# Patient Record
Sex: MALE | Race: WHITE | URBAN - NONMETROPOLITAN AREA
[De-identification: names, ages, dates, MRNs, and addresses within clinical notes are randomized per-mention and may not be internally consistent; named-entity substitution may affect disease eponyms.]

---

## 2017-03-09 ENCOUNTER — DOCTOR'S OFFICE (OUTPATIENT)
Dept: URBAN - NONMETROPOLITAN AREA CLINIC 1 | Facility: CLINIC | Age: 37
Setting detail: OPHTHALMOLOGY
End: 2017-03-09
Payer: COMMERCIAL

## 2017-03-09 DIAGNOSIS — B30.9: ICD-10-CM

## 2017-03-09 PROCEDURE — 92002 INTRM OPH EXAM NEW PATIENT: CPT | Performed by: OPHTHALMOLOGY

## 2017-03-09 ASSESSMENT — REFRACTION_MANIFEST
OS_VA2: 20/
OD_VA2: 20/
OU_VA: 20/
OD_VA1: 20/
OS_VA1: 20/
OS_VA3: 20/
OU_VA: 20/
OS_VA3: 20/
OS_VA3: 20/
OU_VA: 20/
OD_VA1: 20/
OD_VA3: 20/
OD_VA1: 20/
OD_VA3: 20/
OS_VA1: 20/
OD_VA3: 20/
OD_VA2: 20/
OS_VA2: 20/
OD_VA2: 20/
OS_VA2: 20/
OS_VA1: 20/

## 2017-03-09 ASSESSMENT — REFRACTION_CURRENTRX
OS_OVR_VA: 20/
OD_OVR_VA: 20/
OS_OVR_VA: 20/
OS_OVR_VA: 20/
OD_OVR_VA: 20/
OD_OVR_VA: 20/

## 2017-03-09 ASSESSMENT — VISUAL ACUITY
OD_BCVA: 20/20
OS_BCVA: 20/20

## 2018-10-18 ENCOUNTER — RX ONLY (RX ONLY)
Age: 38
End: 2018-10-18

## 2018-10-18 ENCOUNTER — DOCTOR'S OFFICE (OUTPATIENT)
Dept: URBAN - NONMETROPOLITAN AREA CLINIC 1 | Facility: CLINIC | Age: 38
Setting detail: OPHTHALMOLOGY
End: 2018-10-18
Payer: COMMERCIAL

## 2018-10-18 DIAGNOSIS — H52.223: ICD-10-CM

## 2018-10-18 DIAGNOSIS — H52.03: ICD-10-CM

## 2018-10-18 PROBLEM — B30.9: Status: RESOLVED | Noted: 2017-03-09 | Resolved: 2018-10-18

## 2018-10-18 PROCEDURE — 92014 COMPRE OPH EXAM EST PT 1/>: CPT | Performed by: OPTOMETRIST

## 2018-10-18 ASSESSMENT — CONFRONTATIONAL VISUAL FIELD TEST (CVF)
OS_FINDINGS: FULL
OD_FINDINGS: FULL

## 2018-10-18 ASSESSMENT — REFRACTION_AUTOREFRACTION
OD_AXIS: 071
OS_AXIS: 048
OD_CYLINDER: -0.50
OS_SPHERE: +1.00
OS_CYLINDER: -0.50
OD_SPHERE: +1.25

## 2018-10-18 ASSESSMENT — REFRACTION_MANIFEST
OS_CYLINDER: -0.50
OS_VA3: 20/
OS_VA1: 20/20
OU_VA: 20/
OD_VA1: 20/
OU_VA: 20/20
OD_VA3: 20/
OS_VA1: 20/
OS_VA2: 20/
OS_VA3: 20/
OD_VA3: 20/
OD_VA2: 20/
OS_SPHERE: +0.75
OD_VA2: 20/
OD_CYLINDER: -0.50
OS_AXIS: 075
OD_VA1: 20/20
OD_SPHERE: +0.75
OD_AXIS: 090
OS_VA2: 20/

## 2018-10-18 ASSESSMENT — REFRACTION_CURRENTRX
OS_OVR_VA: 20/
OD_OVR_VA: 20/
OD_OVR_VA: 20/
OS_OVR_VA: 20/
OD_OVR_VA: 20/
OS_OVR_VA: 20/

## 2018-10-18 ASSESSMENT — VISUAL ACUITY
OS_BCVA: 20/20-2
OD_BCVA: 20/20

## 2018-10-18 ASSESSMENT — SPHEQUIV_DERIVED
OD_SPHEQUIV: 0.5
OS_SPHEQUIV: 0.5
OD_SPHEQUIV: 1
OS_SPHEQUIV: 0.75

## 2018-11-09 ENCOUNTER — OPTICAL OFFICE (OUTPATIENT)
Dept: URBAN - NONMETROPOLITAN AREA CLINIC 4 | Facility: CLINIC | Age: 38
Setting detail: OPHTHALMOLOGY
End: 2018-11-09
Payer: COMMERCIAL

## 2018-11-09 DIAGNOSIS — H52.223: ICD-10-CM

## 2018-11-09 PROCEDURE — V2103 SPHEROCYLINDR 4.00D/12-2.00D: HCPCS | Performed by: OPTOMETRIST

## 2018-11-09 PROCEDURE — V2750 ANTI-REFLECTIVE COATING: HCPCS | Performed by: OPTOMETRIST

## 2018-11-09 PROCEDURE — V2025 EYEGLASSES DELUX FRAMES: HCPCS | Performed by: OPTOMETRIST

## 2018-11-09 PROCEDURE — V2020 VISION SVCS FRAMES PURCHASES: HCPCS | Performed by: OPTOMETRIST

## 2019-09-03 ENCOUNTER — OPTICAL OFFICE (OUTPATIENT)
Dept: URBAN - NONMETROPOLITAN AREA CLINIC 4 | Facility: CLINIC | Age: 39
Setting detail: OPHTHALMOLOGY
End: 2019-09-03

## 2019-09-03 ENCOUNTER — DOCTOR'S OFFICE (OUTPATIENT)
Dept: URBAN - NONMETROPOLITAN AREA CLINIC 1 | Facility: CLINIC | Age: 39
Setting detail: OPHTHALMOLOGY
End: 2019-09-03

## 2019-09-03 DIAGNOSIS — H52.03: ICD-10-CM

## 2019-09-03 DIAGNOSIS — H01.001: ICD-10-CM

## 2019-09-03 DIAGNOSIS — H52.223: ICD-10-CM

## 2019-09-03 DIAGNOSIS — H01.004: ICD-10-CM

## 2019-09-03 PROCEDURE — V2750 ANTI-REFLECTIVE COATING: HCPCS | Performed by: OPTOMETRIST

## 2019-09-03 PROCEDURE — SELFPAYVIS VISION VISIT SELF PAY: Performed by: OPTOMETRIST

## 2019-09-03 PROCEDURE — V2103 SPHEROCYLINDR 4.00D/12-2.00D: HCPCS | Performed by: OPTOMETRIST

## 2019-09-03 PROCEDURE — V2799 MISC VISION ITEM OR SERVICE: HCPCS | Performed by: OPTOMETRIST

## 2019-09-03 PROCEDURE — V2020 VISION SVCS FRAMES PURCHASES: HCPCS | Performed by: OPTOMETRIST

## 2019-09-03 ASSESSMENT — REFRACTION_MANIFEST
OS_VA1: 20/20
OS_VA3: 20/
OS_AXIS: 090
OD_AXIS: 100
OS_CYLINDER: -0.50
OS_VA1: 20/20
OD_VA3: 20/
OD_CYLINDER: -0.50
OD_VA2: 20/
OD_VA1: 20/20
OD_VA3: 20/
OS_SPHERE: +1.00
OD_SPHERE: +1.00
OS_VA2: 20/
OS_SPHERE: +1.00
OS_VA2: 20/20
OD_SPHERE: +1.00
OD_VA1: 20/20
OS_ADD: +1.00
OD_CYLINDER: -0.50
OD_VA2: 20/20
OU_VA: 20/20
OS_VA3: 20/
OU_VA: 20/
OD_AXIS: 100
OS_AXIS: 090
OD_ADD: +1.00
OS_CYLINDER: -0.50

## 2019-09-03 ASSESSMENT — SPHEQUIV_DERIVED
OS_SPHEQUIV: 1.125
OD_SPHEQUIV: 1.125
OS_SPHEQUIV: 0.75
OS_SPHEQUIV: 0.75
OD_SPHEQUIV: 0.75
OD_SPHEQUIV: 0.75

## 2019-09-03 ASSESSMENT — REFRACTION_CURRENTRX
OD_CYLINDER: -0.50
OD_AXIS: 090
OS_OVR_VA: 20/
OS_CYLINDER: -0.50
OS_OVR_VA: 20/
OD_OVR_VA: 20/
OD_VPRISM_DIRECTION: SV
OS_AXIS: 072
OD_OVR_VA: 20/
OD_OVR_VA: 20/
OD_SPHERE: +0.75
OS_VPRISM_DIRECTION: SV
OS_SPHERE: +0.75
OS_OVR_VA: 20/

## 2019-09-03 ASSESSMENT — REFRACTION_AUTOREFRACTION
OS_SPHERE: +1.25
OD_AXIS: 100
OD_SPHERE: +1.50
OS_AXIS: 095
OS_CYLINDER: -0.25
OD_CYLINDER: -0.75

## 2019-09-03 ASSESSMENT — LID EXAM ASSESSMENTS
OD_BLEPHARITIS: RUL T 1+
OS_BLEPHARITIS: LUL T 1+

## 2019-09-03 ASSESSMENT — VISUAL ACUITY
OS_BCVA: 20/20-1
OD_BCVA: 20/20

## 2019-09-03 ASSESSMENT — CONFRONTATIONAL VISUAL FIELD TEST (CVF)
OD_FINDINGS: FULL
OS_FINDINGS: FULL

## 2022-08-16 ENCOUNTER — OPTICAL OFFICE (OUTPATIENT)
Dept: URBAN - NONMETROPOLITAN AREA CLINIC 4 | Facility: CLINIC | Age: 42
Setting detail: OPHTHALMOLOGY
End: 2022-08-16
Payer: COMMERCIAL

## 2022-08-16 DIAGNOSIS — H52.223: ICD-10-CM

## 2022-08-16 PROCEDURE — V2799 MISC VISION ITEM OR SERVICE: HCPCS | Performed by: OPHTHALMOLOGY

## 2022-08-16 PROCEDURE — V2750 ANTI-REFLECTIVE COATING: HCPCS | Performed by: OPHTHALMOLOGY

## 2022-08-16 PROCEDURE — V2103 SPHEROCYLINDR 4.00D/12-2.00D: HCPCS | Performed by: OPHTHALMOLOGY

## 2022-08-16 PROCEDURE — V2020 VISION SVCS FRAMES PURCHASES: HCPCS | Performed by: OPHTHALMOLOGY

## 2022-09-12 ENCOUNTER — OFFICE VISIT (OUTPATIENT)
Dept: URGENT CARE | Facility: CLINIC | Age: 42
End: 2022-09-12
Payer: COMMERCIAL

## 2022-09-12 VITALS
BODY MASS INDEX: 29.86 KG/M2 | DIASTOLIC BLOOD PRESSURE: 93 MMHG | RESPIRATION RATE: 20 BRPM | HEART RATE: 92 BPM | SYSTOLIC BLOOD PRESSURE: 137 MMHG | HEIGHT: 68 IN | OXYGEN SATURATION: 96 % | TEMPERATURE: 98.4 F | WEIGHT: 197 LBS

## 2022-09-12 DIAGNOSIS — H10.32 ACUTE CONJUNCTIVITIS OF LEFT EYE, UNSPECIFIED ACUTE CONJUNCTIVITIS TYPE: Primary | ICD-10-CM

## 2022-09-12 PROCEDURE — 99203 OFFICE O/P NEW LOW 30 MIN: CPT

## 2022-09-12 RX ORDER — POLYMYXIN B SULFATE AND TRIMETHOPRIM 1; 10000 MG/ML; [USP'U]/ML
1 SOLUTION OPHTHALMIC EVERY 6 HOURS
Qty: 10 ML | Refills: 0 | Status: SHIPPED | OUTPATIENT
Start: 2022-09-12 | End: 2022-09-19

## 2022-09-12 NOTE — PROGRESS NOTES
[facility]        NAME: Cara Thomas is a 36 y o  male  : 1980    MRN: [id number]  DATE: 2022  TIME: 2:30 PM    Assessment and Plan   Acute conjunctivitis of left eye, unspecified acute conjunctivitis type [H10 32]  1  Acute conjunctivitis of left eye, unspecified acute conjunctivitis type  polymyxin b-trimethoprim (POLYTRIM) ophthalmic solution     Will treat conjunctivitis with poly trim,  Pt is a non contact wearer  Patient Instructions   Use eye drops as directed  May place eye drops in the fridge  Follow up with PCP in 3-5 days  Proceed to  ER if symptoms worsen  Chief Complaint     Chief Complaint   Patient presents with    Conjunctivitis         History of Present Illness       Patient is a 36year old male who presents to the office today for left eye pain  States that it started as light sensitivity and then the eye was red  Denies changes to vision  Light sensitivity  Slight headache, no fever  Does not wear contacts  Denies getting anything in to the eye  Review of Systems   Review of Systems   Constitutional: Negative for chills, fatigue and fever  Eyes: Positive for photophobia, pain and redness  Negative for discharge, itching and visual disturbance  Neurological: Positive for headaches  All other systems reviewed and are negative  Current Medications       Current Outpatient Medications:     polymyxin b-trimethoprim (POLYTRIM) ophthalmic solution, Administer 1 drop into the left eye every 6 (six) hours for 7 days, Disp: 10 mL, Rfl: 0    Current Allergies     Allergies as of 2022    (No Known Allergies)            The following portions of the patient's history were reviewed and updated as appropriate: allergies, current medications, past family history, past medical history, past social history, past surgical history and problem list      Past Medical History:   Diagnosis Date    Colitis        History reviewed   No pertinent surgical history  History reviewed  No pertinent family history  Medications have been verified  Objective   /93   Pulse 92   Temp 98 4 °F (36 9 °C)   Resp 20   Ht 5' 8" (1 727 m)   Wt 89 4 kg (197 lb)   SpO2 96%   BMI 29 95 kg/m²        Physical Exam     Physical Exam  Vitals and nursing note reviewed  Constitutional:       General: He is not in acute distress  Appearance: Normal appearance  He is normal weight  He is not ill-appearing or toxic-appearing  Eyes:      General: Lids are normal  Lids are everted, no foreign bodies appreciated  Vision grossly intact  Gaze aligned appropriately  No allergic shiner, visual field deficit or scleral icterus  Right eye: No discharge  Left eye: No foreign body, discharge or hordeolum  Extraocular Movements: Extraocular movements intact  Left eye: Normal extraocular motion and no nystagmus  Conjunctiva/sclera:      Left eye: Left conjunctiva is injected  No chemosis, exudate or hemorrhage  Pupils: Pupils are equal, round, and reactive to light  Left eye: Pupil is round, reactive and not sluggish  Funduscopic exam:        Left eye: No hemorrhage, exudate, AV nicking, arteriolar narrowing or papilledema  Red reflex and venous pulsations present  Visual Fields: Right eye visual fields normal and left eye visual fields normal    Neurological:      Mental Status: He is alert                Visual Acuity Screening    Right eye Left eye Both eyes   Without correction:      With correction: 20/15 20/20 20/13

## 2022-09-14 ENCOUNTER — DOCTOR'S OFFICE (OUTPATIENT)
Dept: URBAN - NONMETROPOLITAN AREA CLINIC 1 | Facility: CLINIC | Age: 42
Setting detail: OPHTHALMOLOGY
End: 2022-09-14
Payer: COMMERCIAL

## 2022-09-14 DIAGNOSIS — H20.00: ICD-10-CM

## 2022-09-14 DIAGNOSIS — H25.13: ICD-10-CM

## 2022-09-14 PROCEDURE — 99214 OFFICE O/P EST MOD 30 MIN: CPT | Performed by: OPHTHALMOLOGY

## 2022-09-14 ASSESSMENT — REFRACTION_CURRENTRX
OD_OVR_VA: 20/
OS_CYLINDER: -0.50
OD_VPRISM_DIRECTION: SV
OS_VPRISM_DIRECTION: SV
OS_AXIS: 072
OD_SPHERE: +0.75
OD_AXIS: 090
OS_OVR_VA: 20/
OD_CYLINDER: -0.50
OS_SPHERE: +0.75

## 2022-09-14 ASSESSMENT — REFRACTION_MANIFEST
OD_ADD: +1.00
OD_CYLINDER: -0.50
OD_VA1: 20/20
OS_CYLINDER: -0.50
OS_VA1: 20/20
OD_SPHERE: +1.00
OD_AXIS: 100
OS_VA1: 20/20
OS_SPHERE: +1.00
OU_VA: 20/20
OS_SPHERE: +1.00
OS_CYLINDER: -0.50
OD_VA1: 20/20
OS_ADD: +1.00
OS_VA2: 20/20
OD_VA2: 20/20
OD_CYLINDER: -0.50
OS_AXIS: 090
OS_AXIS: 090
OD_AXIS: 100
OD_SPHERE: +1.00

## 2022-09-14 ASSESSMENT — REFRACTION_AUTOREFRACTION
OS_AXIS: 092
OD_SPHERE: +0.75
OS_CYLINDER: -0.50
OS_SPHERE: +0.75
OD_CYLINDER: -0.50
OD_AXIS: 096

## 2022-09-14 ASSESSMENT — AXIALLENGTH_DERIVED
OD_AL: 22.6502
OD_AL: 22.6502
OS_AL: 22.613
OS_AL: 22.5239
OS_AL: 22.5239
OD_AL: 22.7403

## 2022-09-14 ASSESSMENT — SPHEQUIV_DERIVED
OS_SPHEQUIV: 0.75
OS_SPHEQUIV: 0.5
OS_SPHEQUIV: 0.75
OD_SPHEQUIV: 0.75
OD_SPHEQUIV: 0.5
OD_SPHEQUIV: 0.75

## 2022-09-14 ASSESSMENT — LID EXAM ASSESSMENTS
OS_BLEPHARITIS: LUL T 1+
OD_BLEPHARITIS: RUL T 1+

## 2022-09-14 ASSESSMENT — KERATOMETRY
OD_K2POWER_DIOPTERS: 45.50
OD_K1POWER_DIOPTERS: 45.25
OS_K2POWER_DIOPTERS: 46.00
OD_AXISANGLE_DEGREES: 043
OS_AXISANGLE_DEGREES: 032
OS_K1POWER_DIOPTERS: 45.50

## 2022-09-14 ASSESSMENT — VISUAL ACUITY
OD_BCVA: 20/20
OS_BCVA: 20/20

## 2022-09-14 ASSESSMENT — CONFRONTATIONAL VISUAL FIELD TEST (CVF)
OD_FINDINGS: FULL
OS_FINDINGS: FULL

## 2022-09-19 ENCOUNTER — RX ONLY (RX ONLY)
Age: 42
End: 2022-09-19

## 2022-09-19 ENCOUNTER — DOCTOR'S OFFICE (OUTPATIENT)
Dept: URBAN - NONMETROPOLITAN AREA CLINIC 1 | Facility: CLINIC | Age: 42
Setting detail: OPHTHALMOLOGY
End: 2022-09-19
Payer: COMMERCIAL

## 2022-09-19 DIAGNOSIS — H20.00: ICD-10-CM

## 2022-09-19 PROCEDURE — 99213 OFFICE O/P EST LOW 20 MIN: CPT | Performed by: OPHTHALMOLOGY

## 2022-09-19 ASSESSMENT — VISUAL ACUITY
OS_BCVA: 20/20-2
OD_BCVA: 20/30-1

## 2022-09-19 ASSESSMENT — REFRACTION_MANIFEST
OS_AXIS: 090
OD_AXIS: 100
OS_VA2: 20/20
OS_AXIS: 090
OD_SPHERE: +1.00
OS_CYLINDER: -0.50
OS_SPHERE: +1.00
OS_SPHERE: +1.00
OS_CYLINDER: -0.50
OD_SPHERE: +1.00
OS_ADD: +1.00
OD_CYLINDER: -0.50
OD_VA1: 20/20
OD_VA1: 20/20
OU_VA: 20/20
OS_VA1: 20/20
OS_VA1: 20/20
OD_VA2: 20/20
OD_AXIS: 100
OD_CYLINDER: -0.50
OD_ADD: +1.00

## 2022-09-19 ASSESSMENT — REFRACTION_CURRENTRX
OS_AXIS: 072
OS_OVR_VA: 20/
OS_CYLINDER: -0.50
OD_AXIS: 090
OD_SPHERE: +0.75
OD_OVR_VA: 20/
OD_VPRISM_DIRECTION: SV
OD_CYLINDER: -0.50
OS_VPRISM_DIRECTION: SV
OS_SPHERE: +0.75

## 2022-09-19 ASSESSMENT — REFRACTION_AUTOREFRACTION
OD_SPHERE: +0.75
OS_CYLINDER: -0.50
OD_CYLINDER: -0.50
OD_AXIS: 096
OS_AXIS: 092
OS_SPHERE: +0.75

## 2022-09-19 ASSESSMENT — AXIALLENGTH_DERIVED
OS_AL: 22.5239
OD_AL: 22.6502
OS_AL: 22.5239
OD_AL: 22.7403
OS_AL: 22.613
OD_AL: 22.6502

## 2022-09-19 ASSESSMENT — KERATOMETRY
OD_K2POWER_DIOPTERS: 45.50
OS_K1POWER_DIOPTERS: 45.50
OD_K1POWER_DIOPTERS: 45.25
OS_K2POWER_DIOPTERS: 46.00
OS_AXISANGLE_DEGREES: 032
OD_AXISANGLE_DEGREES: 043

## 2022-09-19 ASSESSMENT — SPHEQUIV_DERIVED
OD_SPHEQUIV: 0.75
OS_SPHEQUIV: 0.75
OD_SPHEQUIV: 0.5
OS_SPHEQUIV: 0.75
OD_SPHEQUIV: 0.75
OS_SPHEQUIV: 0.5

## 2022-09-19 ASSESSMENT — LID EXAM ASSESSMENTS
OS_BLEPHARITIS: LUL T 1+
OD_BLEPHARITIS: RUL T 1+

## 2022-09-27 ENCOUNTER — DOCTOR'S OFFICE (OUTPATIENT)
Dept: URBAN - NONMETROPOLITAN AREA CLINIC 1 | Facility: CLINIC | Age: 42
Setting detail: OPHTHALMOLOGY
End: 2022-09-27
Payer: COMMERCIAL

## 2022-09-27 DIAGNOSIS — H20.00: ICD-10-CM

## 2022-09-27 PROCEDURE — 99213 OFFICE O/P EST LOW 20 MIN: CPT | Performed by: OPHTHALMOLOGY

## 2022-09-27 ASSESSMENT — REFRACTION_MANIFEST
OD_AXIS: 100
OS_VA2: 20/20
OD_SPHERE: +1.00
OS_CYLINDER: -0.50
OU_VA: 20/20
OS_SPHERE: +1.00
OD_VA1: 20/20
OD_VA1: 20/20
OD_CYLINDER: -0.50
OS_VA1: 20/20
OS_AXIS: 090
OD_AXIS: 100
OS_VA1: 20/20
OS_ADD: +1.00
OD_ADD: +1.00
OS_AXIS: 090
OD_VA2: 20/20
OD_SPHERE: +1.00
OS_SPHERE: +1.00
OS_CYLINDER: -0.50
OD_CYLINDER: -0.50

## 2022-09-27 ASSESSMENT — KERATOMETRY
OS_K1POWER_DIOPTERS: 45.75
OD_K1POWER_DIOPTERS: 45.25
OD_AXISANGLE_DEGREES: 062
OS_K2POWER_DIOPTERS: 45.75
OS_AXISANGLE_DEGREES: 032
OD_K2POWER_DIOPTERS: 45.50

## 2022-09-27 ASSESSMENT — VISUAL ACUITY
OS_BCVA: 20/20
OD_BCVA: 20/20-2

## 2022-09-27 ASSESSMENT — SPHEQUIV_DERIVED
OD_SPHEQUIV: 1.125
OS_SPHEQUIV: 1.25
OD_SPHEQUIV: 0.75
OS_SPHEQUIV: 0.75
OD_SPHEQUIV: 0.75
OS_SPHEQUIV: 0.75

## 2022-09-27 ASSESSMENT — REFRACTION_AUTOREFRACTION
OD_SPHERE: +1.25
OD_AXIS: 101
OS_SPHERE: +1.50
OD_CYLINDER: -0.25
OS_AXIS: 080
OS_CYLINDER: -0.50

## 2022-09-27 ASSESSMENT — REFRACTION_CURRENTRX
OD_SPHERE: +0.75
OD_CYLINDER: -0.50
OS_SPHERE: +0.75
OD_AXIS: 090
OD_VPRISM_DIRECTION: SV
OD_OVR_VA: 20/
OS_AXIS: 072
OS_OVR_VA: 20/
OS_CYLINDER: -0.50
OS_VPRISM_DIRECTION: SV

## 2022-09-27 ASSESSMENT — AXIALLENGTH_DERIVED
OS_AL: 22.5239
OS_AL: 22.5239
OD_AL: 22.6502
OD_AL: 22.6502
OS_AL: 22.3477
OD_AL: 22.5163

## 2022-09-27 ASSESSMENT — LID EXAM ASSESSMENTS
OD_BLEPHARITIS: RUL T 1+
OS_BLEPHARITIS: LUL T 1+

## 2022-09-27 ASSESSMENT — CONFRONTATIONAL VISUAL FIELD TEST (CVF)
OS_FINDINGS: FULL
OD_FINDINGS: FULL

## 2022-10-03 ENCOUNTER — DOCTOR'S OFFICE (OUTPATIENT)
Dept: URBAN - NONMETROPOLITAN AREA CLINIC 1 | Facility: CLINIC | Age: 42
Setting detail: OPHTHALMOLOGY
End: 2022-10-03
Payer: COMMERCIAL

## 2022-10-03 DIAGNOSIS — H20.00: ICD-10-CM

## 2022-10-03 PROBLEM — H52.03 HYPEROPIA; BOTH EYES: Status: ACTIVE | Noted: 2018-10-18

## 2022-10-03 PROBLEM — H01.004 BLEPHARITIS; RIGHT UPPER LID, LEFT UPPER LID: Status: ACTIVE | Noted: 2019-09-03

## 2022-10-03 PROBLEM — H01.001 BLEPHARITIS; RIGHT UPPER LID, LEFT UPPER LID: Status: ACTIVE | Noted: 2019-09-03

## 2022-10-03 PROBLEM — H52.223 ASTIGMATISM, REGULAR; BOTH EYES: Status: ACTIVE | Noted: 2018-10-18

## 2022-10-03 PROCEDURE — 99213 OFFICE O/P EST LOW 20 MIN: CPT | Performed by: OPHTHALMOLOGY

## 2022-10-03 ASSESSMENT — REFRACTION_AUTOREFRACTION
OS_SPHERE: +1.00
OD_AXIS: 104
OD_CYLINDER: +1.00
OD_SPHERE: +1.75

## 2022-10-03 ASSESSMENT — AXIALLENGTH_DERIVED
OS_AL: 22.4405
OD_AL: 22.6079
OD_AL: 22.6079
OS_AL: 22.4405
OD_AL: 22.0837

## 2022-10-03 ASSESSMENT — REFRACTION_CURRENTRX
OD_SPHERE: +0.75
OS_VPRISM_DIRECTION: SV
OS_CYLINDER: -0.50
OD_AXIS: 090
OS_OVR_VA: 20/
OD_CYLINDER: -0.50
OD_VPRISM_DIRECTION: SV
OD_OVR_VA: 20/
OS_AXIS: 072
OS_SPHERE: +0.75

## 2022-10-03 ASSESSMENT — REFRACTION_MANIFEST
OS_AXIS: 090
OS_VA2: 20/20
OD_AXIS: 100
OD_CYLINDER: -0.50
OS_ADD: +1.00
OD_AXIS: 100
OD_VA1: 20/20
OD_CYLINDER: -0.50
OD_SPHERE: +1.00
OS_CYLINDER: -0.50
OS_VA1: 20/20
OD_SPHERE: +1.00
OS_CYLINDER: -0.50
OD_VA1: 20/20
OS_AXIS: 090
OD_VA2: 20/20
OS_SPHERE: +1.00
OS_VA1: 20/20
OU_VA: 20/20
OS_SPHERE: +1.00
OD_ADD: +1.00

## 2022-10-03 ASSESSMENT — SPHEQUIV_DERIVED
OS_SPHEQUIV: 0.75
OD_SPHEQUIV: 0.75
OD_SPHEQUIV: 0.75
OD_SPHEQUIV: 2.25
OS_SPHEQUIV: 0.75

## 2022-10-03 ASSESSMENT — CONFRONTATIONAL VISUAL FIELD TEST (CVF)
OD_FINDINGS: FULL
OS_FINDINGS: FULL

## 2022-10-03 ASSESSMENT — KERATOMETRY
OD_K1POWER_DIOPTERS: 45.50
OD_K2POWER_DIOPTERS: 45.50
OS_K2POWER_DIOPTERS: 46.00
OS_K1POWER_DIOPTERS: 46.00

## 2022-10-03 ASSESSMENT — LID EXAM ASSESSMENTS
OS_BLEPHARITIS: LUL T 1+
OD_BLEPHARITIS: RUL T 1+

## 2022-10-03 ASSESSMENT — VISUAL ACUITY
OD_BCVA: 20/20-2
OS_BCVA: 20/20

## 2022-10-18 ENCOUNTER — DOCTOR'S OFFICE (OUTPATIENT)
Dept: URBAN - NONMETROPOLITAN AREA CLINIC 1 | Facility: CLINIC | Age: 42
Setting detail: OPHTHALMOLOGY
End: 2022-10-18
Payer: COMMERCIAL

## 2022-10-18 DIAGNOSIS — H20.00: ICD-10-CM

## 2022-10-18 PROCEDURE — 99213 OFFICE O/P EST LOW 20 MIN: CPT | Performed by: OPHTHALMOLOGY

## 2022-10-18 ASSESSMENT — LID EXAM ASSESSMENTS
OD_BLEPHARITIS: RUL T
OS_BLEPHARITIS: LUL T

## 2022-10-18 ASSESSMENT — REFRACTION_MANIFEST
OD_CYLINDER: -0.50
OD_ADD: +1.00
OS_VA2: 20/20
OD_VA2: 20/20
OS_SPHERE: +1.00
OS_VA1: 20/20
OD_VA1: 20/20
OS_CYLINDER: -0.50
OU_VA: 20/20
OS_VA1: 20/20
OD_CYLINDER: -0.50
OS_SPHERE: +1.00
OD_SPHERE: +1.00
OD_SPHERE: +1.00
OS_CYLINDER: -0.50
OS_AXIS: 090
OD_AXIS: 100
OD_VA1: 20/20
OS_AXIS: 090
OS_ADD: +1.00
OD_AXIS: 100

## 2022-10-18 ASSESSMENT — KERATOMETRY
OD_AXISANGLE_DEGREES: 031
OS_K2POWER_DIOPTERS: 46.00
OS_AXISANGLE_DEGREES: 090
OS_K1POWER_DIOPTERS: 45.75
OD_K1POWER_DIOPTERS: 45.25
OD_K2POWER_DIOPTERS: 45.50

## 2022-10-18 ASSESSMENT — SPHEQUIV_DERIVED
OD_SPHEQUIV: 0.75
OS_SPHEQUIV: 0.75
OD_SPHEQUIV: 0.5
OD_SPHEQUIV: 0.75
OS_SPHEQUIV: 0.75

## 2022-10-18 ASSESSMENT — REFRACTION_CURRENTRX
OS_OVR_VA: 20/
OD_VPRISM_DIRECTION: SV
OD_OVR_VA: 20/
OD_CYLINDER: -0.50
OD_AXIS: 090
OS_SPHERE: +0.75
OS_CYLINDER: -0.50
OS_AXIS: 072
OD_SPHERE: +0.75
OS_VPRISM_DIRECTION: SV

## 2022-10-18 ASSESSMENT — REFRACTION_AUTOREFRACTION
OS_SPHERE: +0.25
OD_CYLINDER: -0.50
OD_AXIS: 084
OD_SPHERE: +0.75

## 2022-10-18 ASSESSMENT — AXIALLENGTH_DERIVED
OD_AL: 22.6502
OD_AL: 22.7403
OS_AL: 22.4821
OD_AL: 22.6502
OS_AL: 22.4821

## 2022-10-18 ASSESSMENT — CONFRONTATIONAL VISUAL FIELD TEST (CVF)
OD_FINDINGS: FULL
OS_FINDINGS: FULL

## 2022-10-18 ASSESSMENT — VISUAL ACUITY
OD_BCVA: 20/20
OS_BCVA: 20/20

## 2022-11-15 ENCOUNTER — DOCTOR'S OFFICE (OUTPATIENT)
Dept: URBAN - NONMETROPOLITAN AREA CLINIC 1 | Facility: CLINIC | Age: 42
Setting detail: OPHTHALMOLOGY
End: 2022-11-15
Payer: COMMERCIAL

## 2022-11-15 DIAGNOSIS — H20.00: ICD-10-CM

## 2022-11-15 PROCEDURE — 99213 OFFICE O/P EST LOW 20 MIN: CPT | Performed by: OPHTHALMOLOGY

## 2022-11-15 ASSESSMENT — LID EXAM ASSESSMENTS
OS_BLEPHARITIS: LUL T
OD_BLEPHARITIS: RUL T

## 2022-11-15 ASSESSMENT — REFRACTION_CURRENTRX
OS_SPHERE: +0.75
OS_OVR_VA: 20/
OD_OVR_VA: 20/
OS_VPRISM_DIRECTION: SV
OS_AXIS: 072
OD_VPRISM_DIRECTION: SV
OD_AXIS: 090
OD_SPHERE: +0.75
OS_CYLINDER: -0.50
OD_CYLINDER: -0.50

## 2022-11-15 ASSESSMENT — REFRACTION_MANIFEST
OD_VA1: 20/20
OD_ADD: +1.00
OS_SPHERE: +1.00
OS_VA2: 20/20
OS_SPHERE: +1.00
OS_VA1: 20/20
OU_VA: 20/20
OD_SPHERE: +1.00
OS_AXIS: 090
OS_CYLINDER: -0.50
OD_SPHERE: +1.00
OD_VA2: 20/20
OD_AXIS: 100
OS_CYLINDER: -0.50
OS_VA1: 20/20
OD_CYLINDER: -0.50
OD_VA1: 20/20
OS_AXIS: 090
OD_CYLINDER: -0.50
OD_AXIS: 100
OS_ADD: +1.00

## 2022-11-15 ASSESSMENT — VISUAL ACUITY
OS_BCVA: 20/20
OD_BCVA: 20/20

## 2022-11-15 ASSESSMENT — AXIALLENGTH_DERIVED
OS_AL: 22.0278
OS_AL: 22.113
OD_AL: 22.6926
OS_AL: 22.113
OD_AL: 22.5137
OD_AL: 22.6926

## 2022-11-15 ASSESSMENT — REFRACTION_AUTOREFRACTION
OS_SPHERE: +1.00
OD_AXIS: 116
OD_CYLINDER: -0.50
OD_SPHERE: +1.50
OS_CYLINDER: 0.00

## 2022-11-15 ASSESSMENT — CONFRONTATIONAL VISUAL FIELD TEST (CVF)
OD_FINDINGS: FULL
OS_FINDINGS: FULL

## 2022-11-15 ASSESSMENT — SPHEQUIV_DERIVED
OD_SPHEQUIV: 0.75
OD_SPHEQUIV: 1.25
OD_SPHEQUIV: 0.75
OS_SPHEQUIV: 0.75
OS_SPHEQUIV: 0.75
OS_SPHEQUIV: 1

## 2022-11-15 ASSESSMENT — KERATOMETRY
OD_K2POWER_DIOPTERS: 45.50
OD_AXISANGLE_DEGREES: 032
OS_AXISANGLE_DEGREES: 102
OS_K2POWER_DIOPTERS: 48.00
OD_K1POWER_DIOPTERS: 45.00
OS_K1POWER_DIOPTERS: 46.00

## 2022-12-14 ENCOUNTER — RX ONLY (RX ONLY)
Age: 42
End: 2022-12-14

## 2022-12-14 ENCOUNTER — DOCTOR'S OFFICE (OUTPATIENT)
Dept: URBAN - NONMETROPOLITAN AREA CLINIC 1 | Facility: CLINIC | Age: 42
Setting detail: OPHTHALMOLOGY
End: 2022-12-14
Payer: COMMERCIAL

## 2022-12-14 DIAGNOSIS — H20.00: ICD-10-CM

## 2022-12-14 PROCEDURE — 99213 OFFICE O/P EST LOW 20 MIN: CPT | Performed by: OPHTHALMOLOGY

## 2022-12-14 ASSESSMENT — REFRACTION_AUTOREFRACTION
OS_CYLINDER: 0.00
OD_CYLINDER: -0.50
OD_SPHERE: +1.50
OS_SPHERE: +1.00
OD_AXIS: 116

## 2022-12-14 ASSESSMENT — LID EXAM ASSESSMENTS
OS_BLEPHARITIS: LUL T
OD_BLEPHARITIS: RUL T

## 2022-12-14 ASSESSMENT — REFRACTION_MANIFEST
OD_VA1: 20/20
OS_SPHERE: +1.00
OD_CYLINDER: -0.50
OD_SPHERE: +1.00
OS_ADD: +1.00
OU_VA: 20/20
OS_VA1: 20/20
OD_SPHERE: +1.00
OS_CYLINDER: -0.50
OS_AXIS: 090
OD_VA2: 20/20
OS_VA2: 20/20
OS_CYLINDER: -0.50
OD_AXIS: 100
OD_AXIS: 100
OS_AXIS: 090
OD_ADD: +1.00
OD_CYLINDER: -0.50
OD_VA1: 20/20
OS_SPHERE: +1.00
OS_VA1: 20/20

## 2022-12-14 ASSESSMENT — AXIALLENGTH_DERIVED
OS_AL: 22.113
OD_AL: 22.6926
OD_AL: 22.5137
OS_AL: 22.0278
OS_AL: 22.113
OD_AL: 22.6926

## 2022-12-14 ASSESSMENT — KERATOMETRY
OS_K1POWER_DIOPTERS: 46.00
OD_K1POWER_DIOPTERS: 45.00
OD_AXISANGLE_DEGREES: 032
OS_AXISANGLE_DEGREES: 102
OD_K2POWER_DIOPTERS: 45.50
OS_K2POWER_DIOPTERS: 48.00

## 2022-12-14 ASSESSMENT — REFRACTION_CURRENTRX
OS_SPHERE: +0.75
OS_VPRISM_DIRECTION: SV
OD_AXIS: 090
OD_CYLINDER: -0.50
OD_SPHERE: +0.75
OS_OVR_VA: 20/
OS_CYLINDER: -0.50
OD_VPRISM_DIRECTION: SV
OD_OVR_VA: 20/
OS_AXIS: 072

## 2022-12-14 ASSESSMENT — SPHEQUIV_DERIVED
OS_SPHEQUIV: 1
OD_SPHEQUIV: 0.75
OD_SPHEQUIV: 0.75
OS_SPHEQUIV: 0.75
OD_SPHEQUIV: 1.25
OS_SPHEQUIV: 0.75

## 2022-12-14 ASSESSMENT — CONFRONTATIONAL VISUAL FIELD TEST (CVF)
OS_FINDINGS: FULL
OD_FINDINGS: FULL

## 2022-12-14 ASSESSMENT — VISUAL ACUITY
OD_BCVA: 20/20
OS_BCVA: 20/20

## 2023-04-12 ENCOUNTER — DOCTOR'S OFFICE (OUTPATIENT)
Dept: URBAN - NONMETROPOLITAN AREA CLINIC 1 | Facility: CLINIC | Age: 43
Setting detail: OPHTHALMOLOGY
End: 2023-04-12
Payer: COMMERCIAL

## 2023-04-12 DIAGNOSIS — H20.00: ICD-10-CM

## 2023-04-12 PROCEDURE — 99213 OFFICE O/P EST LOW 20 MIN: CPT | Performed by: OPHTHALMOLOGY

## 2023-04-12 ASSESSMENT — LID EXAM ASSESSMENTS
OS_BLEPHARITIS: LUL T
OD_BLEPHARITIS: RUL T

## 2023-04-12 ASSESSMENT — SPHEQUIV_DERIVED
OS_SPHEQUIV: 1.25
OD_SPHEQUIV: 1.25
OD_SPHEQUIV: 0.75
OS_SPHEQUIV: 0.75
OS_SPHEQUIV: 0.75
OD_SPHEQUIV: 0.75

## 2023-04-12 ASSESSMENT — REFRACTION_MANIFEST
OS_SPHERE: +1.00
OU_VA: 20/20
OS_VA2: 20/20
OD_ADD: +1.00
OS_VA1: 20/20
OD_SPHERE: +1.00
OD_VA1: 20/20
OD_CYLINDER: -0.50
OS_AXIS: 090
OS_CYLINDER: -0.50
OD_CYLINDER: -0.50
OD_VA2: 20/20
OS_ADD: +1.00
OS_SPHERE: +1.00
OS_CYLINDER: -0.50
OS_VA1: 20/20
OD_SPHERE: +1.00
OD_AXIS: 100
OD_AXIS: 100
OS_AXIS: 090
OD_VA1: 20/20

## 2023-04-12 ASSESSMENT — AXIALLENGTH_DERIVED
OD_AL: 22.6926
OD_AL: 22.5137
OS_AL: 22.4304
OD_AL: 22.6926
OS_AL: 22.6079
OS_AL: 22.6079

## 2023-04-12 ASSESSMENT — REFRACTION_CURRENTRX
OD_SPHERE: +0.75
OS_CYLINDER: -0.50
OD_CYLINDER: -0.50
OS_SPHERE: +0.75
OS_VPRISM_DIRECTION: SV
OS_OVR_VA: 20/
OD_OVR_VA: 20/
OD_VPRISM_DIRECTION: SV
OD_AXIS: 090
OS_AXIS: 072

## 2023-04-12 ASSESSMENT — REFRACTION_AUTOREFRACTION
OD_AXIS: 098
OS_SPHERE: +1.50
OS_AXIS: 100
OD_CYLINDER: -1.00
OS_CYLINDER: -0.50
OD_SPHERE: +1.75

## 2023-04-12 ASSESSMENT — KERATOMETRY
OS_K2POWER_DIOPTERS: 45.75
OD_AXISANGLE_DEGREES: 026
OS_AXISANGLE_DEGREES: 004
OD_K1POWER_DIOPTERS: 45.00
OD_K2POWER_DIOPTERS: 45.50
OS_K1POWER_DIOPTERS: 45.25

## 2023-04-12 ASSESSMENT — VISUAL ACUITY
OS_BCVA: 20/20-1
OD_BCVA: 20/20

## 2023-04-12 ASSESSMENT — CONFRONTATIONAL VISUAL FIELD TEST (CVF)
OS_FINDINGS: FULL
OD_FINDINGS: FULL

## 2023-11-07 ENCOUNTER — APPOINTMENT (EMERGENCY)
Dept: CT IMAGING | Facility: HOSPITAL | Age: 43
End: 2023-11-07
Payer: COMMERCIAL

## 2023-11-07 ENCOUNTER — HOSPITAL ENCOUNTER (EMERGENCY)
Facility: HOSPITAL | Age: 43
Discharge: HOME | End: 2023-11-07
Attending: STUDENT IN AN ORGANIZED HEALTH CARE EDUCATION/TRAINING PROGRAM
Payer: COMMERCIAL

## 2023-11-07 VITALS
RESPIRATION RATE: 16 BRPM | BODY MASS INDEX: 30.2 KG/M2 | SYSTOLIC BLOOD PRESSURE: 145 MMHG | WEIGHT: 198.63 LBS | TEMPERATURE: 97.8 F | DIASTOLIC BLOOD PRESSURE: 86 MMHG | OXYGEN SATURATION: 98 % | HEART RATE: 89 BPM

## 2023-11-07 DIAGNOSIS — N20.1 LEFT URETERAL CALCULUS: Primary | ICD-10-CM

## 2023-11-07 LAB
ANION GAP SERPL CALCULATED.3IONS-SCNC: 9 MMOL/L
BACTERIA UR QL AUTO: NORMAL /HPF
BASOPHILS # BLD AUTO: 0.1 THOUSANDS/ÂΜL (ref 0–0.1)
BASOPHILS NFR BLD AUTO: 1 % (ref 0–1)
BILIRUB UR QL STRIP: NEGATIVE
BUN SERPL-MCNC: 20 MG/DL (ref 5–25)
CALCIUM SERPL-MCNC: 9 MG/DL (ref 8.4–10.2)
CHLORIDE SERPL-SCNC: 102 MMOL/L (ref 96–108)
CLARITY UR: CLEAR
CO2 SERPL-SCNC: 24 MMOL/L (ref 21–32)
COLOR UR: YELLOW
CREAT SERPL-MCNC: 1.38 MG/DL (ref 0.6–1.3)
EOSINOPHIL # BLD AUTO: 0.07 THOUSAND/ÂΜL (ref 0–0.61)
EOSINOPHIL NFR BLD AUTO: 1 % (ref 0–6)
ERYTHROCYTE [DISTWIDTH] IN BLOOD BY AUTOMATED COUNT: 13.9 % (ref 11.6–15.1)
GFR SERPL CREATININE-BSD FRML MDRD: 61 ML/MIN/1.73SQ M
GLUCOSE SERPL-MCNC: 103 MG/DL (ref 65–140)
GLUCOSE UR STRIP-MCNC: NEGATIVE MG/DL
HCT VFR BLD AUTO: 45.1 % (ref 36.5–49.3)
HGB BLD-MCNC: 14.8 G/DL (ref 12–17)
HGB UR QL STRIP.AUTO: ABNORMAL
IMM GRANULOCYTES # BLD AUTO: 0.05 THOUSAND/UL (ref 0–0.2)
IMM GRANULOCYTES NFR BLD AUTO: 0 % (ref 0–2)
KETONES UR STRIP-MCNC: NEGATIVE MG/DL
LEUKOCYTE ESTERASE UR QL STRIP: NEGATIVE
LYMPHOCYTES # BLD AUTO: 1.38 THOUSANDS/ÂΜL (ref 0.6–4.47)
LYMPHOCYTES NFR BLD AUTO: 12 % (ref 14–44)
MCH RBC QN AUTO: 26.8 PG (ref 26.8–34.3)
MCHC RBC AUTO-ENTMCNC: 32.8 G/DL (ref 31.4–37.4)
MCV RBC AUTO: 82 FL (ref 82–98)
MONOCYTES # BLD AUTO: 0.72 THOUSAND/ÂΜL (ref 0.17–1.22)
MONOCYTES NFR BLD AUTO: 6 % (ref 4–12)
NEUTROPHILS # BLD AUTO: 9.49 THOUSANDS/ÂΜL (ref 1.85–7.62)
NEUTS SEG NFR BLD AUTO: 80 % (ref 43–75)
NITRITE UR QL STRIP: NEGATIVE
NON-SQ EPI CELLS URNS QL MICRO: NORMAL /HPF
NRBC BLD AUTO-RTO: 0 /100 WBCS
PH UR STRIP.AUTO: 5 [PH]
PLATELET # BLD AUTO: 250 THOUSANDS/UL (ref 149–390)
PMV BLD AUTO: 9.4 FL (ref 8.9–12.7)
POTASSIUM SERPL-SCNC: 4 MMOL/L (ref 3.5–5.3)
PROT UR STRIP-MCNC: NEGATIVE MG/DL
RBC # BLD AUTO: 5.52 MILLION/UL (ref 3.88–5.62)
RBC #/AREA URNS AUTO: NORMAL /HPF
SODIUM SERPL-SCNC: 135 MMOL/L (ref 135–147)
SP GR UR STRIP.AUTO: >=1.03 (ref 1–1.03)
UROBILINOGEN UR QL STRIP.AUTO: 0.2 E.U./DL
WBC # BLD AUTO: 11.81 THOUSAND/UL (ref 4.31–10.16)
WBC #/AREA URNS AUTO: NORMAL /HPF

## 2023-11-07 PROCEDURE — 81001 URINALYSIS AUTO W/SCOPE: CPT | Performed by: STUDENT IN AN ORGANIZED HEALTH CARE EDUCATION/TRAINING PROGRAM

## 2023-11-07 PROCEDURE — 96376 TX/PRO/DX INJ SAME DRUG ADON: CPT

## 2023-11-07 PROCEDURE — 36415 COLL VENOUS BLD VENIPUNCTURE: CPT | Performed by: STUDENT IN AN ORGANIZED HEALTH CARE EDUCATION/TRAINING PROGRAM

## 2023-11-07 PROCEDURE — 85025 COMPLETE CBC W/AUTO DIFF WBC: CPT | Performed by: STUDENT IN AN ORGANIZED HEALTH CARE EDUCATION/TRAINING PROGRAM

## 2023-11-07 PROCEDURE — 99284 EMERGENCY DEPT VISIT MOD MDM: CPT | Performed by: STUDENT IN AN ORGANIZED HEALTH CARE EDUCATION/TRAINING PROGRAM

## 2023-11-07 PROCEDURE — G1004 CDSM NDSC: HCPCS

## 2023-11-07 PROCEDURE — 99284 EMERGENCY DEPT VISIT MOD MDM: CPT

## 2023-11-07 PROCEDURE — 96374 THER/PROPH/DIAG INJ IV PUSH: CPT

## 2023-11-07 PROCEDURE — 96361 HYDRATE IV INFUSION ADD-ON: CPT

## 2023-11-07 PROCEDURE — 80048 BASIC METABOLIC PNL TOTAL CA: CPT | Performed by: STUDENT IN AN ORGANIZED HEALTH CARE EDUCATION/TRAINING PROGRAM

## 2023-11-07 PROCEDURE — 96375 TX/PRO/DX INJ NEW DRUG ADDON: CPT

## 2023-11-07 PROCEDURE — 74176 CT ABD & PELVIS W/O CONTRAST: CPT

## 2023-11-07 RX ORDER — ONDANSETRON 4 MG/1
4 TABLET, ORALLY DISINTEGRATING ORAL EVERY 6 HOURS PRN
Qty: 20 TABLET | Refills: 0 | Status: SHIPPED | OUTPATIENT
Start: 2023-11-07

## 2023-11-07 RX ORDER — ONDANSETRON 2 MG/ML
4 INJECTION INTRAMUSCULAR; INTRAVENOUS ONCE
Status: COMPLETED | OUTPATIENT
Start: 2023-11-07 | End: 2023-11-07

## 2023-11-07 RX ORDER — KETOROLAC TROMETHAMINE 30 MG/ML
15 INJECTION, SOLUTION INTRAMUSCULAR; INTRAVENOUS ONCE
Status: COMPLETED | OUTPATIENT
Start: 2023-11-07 | End: 2023-11-07

## 2023-11-07 RX ADMIN — ONDANSETRON 4 MG: 2 INJECTION INTRAMUSCULAR; INTRAVENOUS at 16:42

## 2023-11-07 RX ADMIN — KETOROLAC TROMETHAMINE 15 MG: 30 INJECTION, SOLUTION INTRAMUSCULAR; INTRAVENOUS at 18:22

## 2023-11-07 RX ADMIN — SODIUM CHLORIDE 1000 ML: 0.9 INJECTION, SOLUTION INTRAVENOUS at 16:41

## 2023-11-07 RX ADMIN — KETOROLAC TROMETHAMINE 15 MG: 30 INJECTION, SOLUTION INTRAMUSCULAR; INTRAVENOUS at 16:42

## 2023-11-07 NOTE — DISCHARGE INSTRUCTIONS
Ibuprofen 600 mg every 6 hours and Tylenol 1000 mg every 6 hours recommended for pain. You are being provided with a short course of Zofran as needed for nausea/vomiting. Drink plenty of clear/hydrating fluids over the next few days. Follow-up with your family doctor within the next week for repeat labs (BMP). An outpatient referral to urology was provided. Expect a phone call within the next few days to set up the appointment. If you develop worsening pain/nausea/vomiting or fever/chills, return to the emergency department for reevaluation.

## 2023-11-07 NOTE — ED PROVIDER NOTES
History  Chief Complaint   Patient presents with    Flank Pain     Pt reports left flank pain. Concerned for kidney stone, no hx of the same. History provided by:  Patient and spouse  Flank Pain  Pain location:  L flank  Pain quality: gnawing, sharp and stabbing    Pain radiates to:  LLQ  Pain severity:  Moderate  Onset quality:  Sudden  Duration:  3 hours  Timing:  Intermittent  Progression:  Waxing and waning  Chronicity:  New  Context comment:  Developed left flank pain approx 3 hr PTA. Did not take anything for pain. Expresses mild nausea wo vomiting. No known hx of kidney stones. Relieved by:  None tried  Worsened by: Movement, deep breathing and palpation  Ineffective treatments:  None tried  Associated symptoms: dysuria and nausea    Associated symptoms: no anorexia, no chest pain, no chills, no cough, no diarrhea, no fatigue, no fever, no flatus, no shortness of breath and no vomiting      Past Medical History:   Diagnosis Date    Colitis      Past Surgical History:   Procedure Laterality Date    COLON SURGERY      pt states no large intestine or colon     History reviewed. No pertinent family history. I have reviewed and agree with the history as documented. E-Cigarette/Vaping    E-Cigarette Use Never User      E-Cigarette/Vaping Substances     Social History     Tobacco Use    Smoking status: Never    Smokeless tobacco: Never   Vaping Use    Vaping Use: Never used   Substance Use Topics    Alcohol use: Never    Drug use: Never       Review of Systems   Constitutional:  Negative for activity change, appetite change, chills, fatigue and fever. Respiratory:  Negative for cough, chest tightness, shortness of breath and wheezing. Cardiovascular:  Negative for chest pain and palpitations. Gastrointestinal:  Positive for abdominal pain and nausea. Negative for anorexia, diarrhea, flatus and vomiting. Genitourinary:  Positive for dysuria and flank pain.  Negative for decreased urine volume, difficulty urinating, frequency, penile pain, testicular pain and urgency. Musculoskeletal:  Positive for back pain. Negative for myalgias, neck pain and neck stiffness. Skin:  Negative for color change, pallor, rash and wound. Neurological:  Negative for dizziness, syncope, weakness, light-headedness and headaches. All other systems reviewed and are negative. Physical Exam  Physical Exam  Vitals and nursing note reviewed. Constitutional:       General: He is in acute distress. Appearance: He is not ill-appearing or toxic-appearing. HENT:      Head: Normocephalic and atraumatic. Right Ear: External ear normal.      Left Ear: External ear normal.   Eyes:      General: No scleral icterus. Right eye: No discharge. Left eye: No discharge. Extraocular Movements: Extraocular movements intact. Conjunctiva/sclera: Conjunctivae normal.   Cardiovascular:      Rate and Rhythm: Normal rate and regular rhythm. Pulses: Normal pulses. Heart sounds: Normal heart sounds. No murmur heard. Pulmonary:      Effort: Pulmonary effort is normal. No respiratory distress. Breath sounds: Normal breath sounds. No stridor. No wheezing, rhonchi or rales. Chest:      Chest wall: No tenderness. Abdominal:      General: Bowel sounds are normal.      Palpations: Abdomen is soft. Tenderness: There is abdominal tenderness. There is left CVA tenderness. There is no right CVA tenderness, guarding or rebound. Comments: TTP along the LLQ. No rebound or guarding. Normoactive bowel sounds. Musculoskeletal:         General: Tenderness present. Comments: Tenderness to palpation along the left flank/lower back. No midline tenderness. No overlying skin changes. Skin:     General: Skin is warm and dry. Capillary Refill: Capillary refill takes less than 2 seconds. Coloration: Skin is not jaundiced or pale. Findings: No bruising, erythema, lesion or rash. Neurological:      General: No focal deficit present. Mental Status: He is alert and oriented to person, place, and time. Cranial Nerves: No cranial nerve deficit. Sensory: No sensory deficit. Motor: No weakness. Psychiatric:         Mood and Affect: Mood normal.         Behavior: Behavior normal.         Thought Content:  Thought content normal.         Judgment: Judgment normal.         Vital Signs  ED Triage Vitals [11/07/23 1556]   Temperature Pulse Respirations Blood Pressure SpO2   97.8 °F (36.6 °C) 90 18 (!) 172/104 98 %      Temp Source Heart Rate Source Patient Position - Orthostatic VS BP Location FiO2 (%)   Temporal Monitor Lying Left arm --      Pain Score       8           Vitals:    11/07/23 1556 11/07/23 1600 11/07/23 1824   BP: (!) 172/104 (!) 168/101 145/86   Pulse: 90 89 89   Patient Position - Orthostatic VS: Lying Lying Lying         Visual Acuity      ED Medications  Medications   ketorolac (TORADOL) injection 15 mg (15 mg Intravenous Given 11/7/23 1642)   ondansetron (ZOFRAN) injection 4 mg (4 mg Intravenous Given 11/7/23 1642)   sodium chloride 0.9 % bolus 1,000 mL (0 mL Intravenous Stopped 11/7/23 1741)   ketorolac (TORADOL) injection 15 mg (15 mg Intravenous Given 11/7/23 1822)       Diagnostic Studies  Results Reviewed       Procedure Component Value Units Date/Time    Urine Microscopic [[id number]]  (Normal) Collected: 11/07/23 1641    Lab Status: Final result Specimen: Urine, Clean Catch Updated: 11/07/23 1708     RBC, UA 1-2 /hpf      WBC, UA None Seen /hpf      Epithelial Cells Occasional /hpf      Bacteria, UA None Seen /hpf     Basic metabolic panel [[id number]]  (Abnormal) Collected: 11/07/23 1641    Lab Status: Final result Specimen: Blood from Arm, Left Updated: 11/07/23 1705     Sodium 135 mmol/L      Potassium 4.0 mmol/L      Chloride 102 mmol/L      CO2 24 mmol/L      ANION GAP 9 mmol/L      BUN 20 mg/dL      Creatinine 1.38 mg/dL      Glucose 103 mg/dL Calcium 9.0 mg/dL      eGFR 61 ml/min/1.73sq m     Narrative:      National Kidney Disease Foundation guidelines for Chronic Kidney Disease (CKD):     Stage 1 with normal or high GFR (GFR > 90 mL/min/1.73 square meters)    Stage 2 Mild CKD (GFR = 60-89 mL/min/1.73 square meters)    Stage 3A Moderate CKD (GFR = 45-59 mL/min/1.73 square meters)    Stage 3B Moderate CKD (GFR = 30-44 mL/min/1.73 square meters)    Stage 4 Severe CKD (GFR = 15-29 mL/min/1.73 square meters)    Stage 5 End Stage CKD (GFR <15 mL/min/1.73 square meters)  Note: GFR calculation is accurate only with a steady state creatinine    UA w Reflex to Microscopic w Reflex to Culture [[id number]]  (Abnormal) Collected: 11/07/23 1641    Lab Status: Final result Specimen: Urine, Clean Catch Updated: 11/07/23 1657     Color, UA Yellow     Clarity, UA Clear     Specific Gravity, UA >=1.030     pH, UA 5.0     Leukocytes, UA Negative     Nitrite, UA Negative     Protein, UA Negative mg/dl      Glucose, UA Negative mg/dl      Ketones, UA Negative mg/dl      Urobilinogen, UA 0.2 E.U./dl      Bilirubin, UA Negative     Occult Blood, UA Small    CBC and differential [[id number]]  (Abnormal) Collected: 11/07/23 1641    Lab Status: Final result Specimen: Blood from Arm, Left Updated: 11/07/23 1651     WBC 11.81 Thousand/uL      RBC 5.52 Million/uL      Hemoglobin 14.8 g/dL      Hematocrit 45.1 %      MCV 82 fL      MCH 26.8 pg      MCHC 32.8 g/dL      RDW 13.9 %      MPV 9.4 fL      Platelets 077 Thousands/uL      nRBC 0 /100 WBCs      Neutrophils Relative 80 %      Immat GRANS % 0 %      Lymphocytes Relative 12 %      Monocytes Relative 6 %      Eosinophils Relative 1 %      Basophils Relative 1 %      Neutrophils Absolute 9.49 Thousands/µL      Immature Grans Absolute 0.05 Thousand/uL      Lymphocytes Absolute 1.38 Thousands/µL      Monocytes Absolute 0.72 Thousand/µL      Eosinophils Absolute 0.07 Thousand/µL      Basophils Absolute 0.10 Thousands/µL CT renal stone study abdomen pelvis wo contrast   Final Result by Rajan Coyne DO (11/07 1757)   Nonobstructing 2 mm calculus in the distal left ureter causing mild left side perinephric edema and periureteric edema without hydronephrosis or hydroureter. Small hiatal hernia. Expected postop changes. Workstation performed: [id number]                    Procedures  Procedures         ED Course  ED Course as of 11/07/23 2117   Tue Nov 07, 2023   1734 Mild/nonspecific leukocytosis. Hemoglobin is within normal limits. Small hematuria noted on urinalysis. Noninfectious appearing. 1735 Creatinine 1.38. No previous values. No other significant abnormalities noted on BMP.   1803 CT +Nonobstructing 2 mm calculus in the distal left ureter causing mild left side perinephric edema and periureteric edema without hydronephrosis or hydroureter. 1811 Upon reevaluation, the patient expresses mild pain. Will administer an additional dose of IV Toradol. SBIRT 20yo+      Flowsheet Row Most Recent Value   Initial Alcohol Screen: US AUDIT-C     1. How often do you have a drink containing alcohol? 0 Filed at: 11/07/2023 1600   2. How many drinks containing alcohol do you have on a typical day you are drinking? 0 Filed at: 11/07/2023 1600   3a. Male UNDER 65: How often do you have five or more drinks on one occasion? 0 Filed at: 11/07/2023 1600   Audit-C Score 0 Filed at: 11/07/2023 1600   BONIFACIO: How many times in the past year have you. .. Used an illegal drug or used a prescription medication for non-medical reasons? Never Filed at: 11/07/2023 1600                      Medical Decision Making  The differential diagnoses include but are not limited to ureteral calculus, diverticulitis, hydronephrosis, pyelonephritis  Vital signs reviewed. The patient's hx and clinical findings are most consistent with the below diagnosis. Pain improved with IV Toradol.  Nausea resolved. UA non infectious appearing. Renal function 1.38--unknown baseline. The patient was administered a bolus of IVF. Oral hydration encouraged. OP Urology referral placed. Other recommendations/return precautions discussed. All questions addressed. Stable for discharge. Problems Addressed:  Left ureteral calculus: acute illness or injury    Amount and/or Complexity of Data Reviewed  Labs: ordered. Decision-making details documented in ED Course. Radiology: ordered. Decision-making details documented in ED Course. Risk  Prescription drug management. Disposition  Final diagnoses:   Left ureteral calculus     Time reflects when diagnosis was documented in both MDM as applicable and the Disposition within this note       Time User Action Codes Description Comment    11/7/2023  6:37 PM Milas Baton Add [N20.1] Ureteral calculus     11/7/2023  6:38 PM Milas Baton Remove [N20.1] Ureteral calculus     11/7/2023  6:38 PM Milas Baton Add [N20.1] Left ureteral calculus           ED Disposition       ED Disposition   Discharge    Condition   Stable    Date/Time   Tue Nov 7, 2023 1837    [facility] discharge to home/self care.                    Follow-up Information       Follow up With Specialties Details Why Contact Info    Jose Ferrer MD Urology In 1 week  [address] [facility]  [phone]              Discharge Medication List as of 11/7/2023  6:39 PM        START taking these medications    Details   ondansetron (ZOFRAN-ODT) 4 mg disintegrating tablet Take 1 tablet (4 mg total) by mouth every 6 (six) hours as needed for nausea or vomiting, Starting Tue 11/7/2023, Normal                 PDMP Review       None            ED Provider  Electronically Signed by             Chata Peters DO  11/07/23 2121

## 2023-11-08 RX ORDER — NAPROXEN 500 MG/1
500 TABLET ORAL 2 TIMES DAILY PRN
Qty: 30 TABLET | Refills: 0 | Status: SHIPPED | OUTPATIENT
Start: 2023-11-08

## 2024-04-16 ENCOUNTER — DOCTOR'S OFFICE (OUTPATIENT)
Dept: URBAN - NONMETROPOLITAN AREA CLINIC 1 | Facility: CLINIC | Age: 44
Setting detail: OPHTHALMOLOGY
End: 2024-04-16
Payer: COMMERCIAL

## 2024-04-16 ENCOUNTER — RX ONLY (RX ONLY)
Age: 44
End: 2024-04-16

## 2024-04-16 DIAGNOSIS — H20.00: ICD-10-CM

## 2024-04-16 DIAGNOSIS — H01.001: ICD-10-CM

## 2024-04-16 DIAGNOSIS — H01.004: ICD-10-CM

## 2024-04-16 DIAGNOSIS — H25.13: ICD-10-CM

## 2024-04-16 PROCEDURE — 92014 COMPRE OPH EXAM EST PT 1/>: CPT | Performed by: OPHTHALMOLOGY

## 2025-03-01 ENCOUNTER — APPOINTMENT (EMERGENCY)
Dept: CT IMAGING | Facility: HOSPITAL | Age: 45
End: 2025-03-01
Payer: COMMERCIAL

## 2025-03-01 ENCOUNTER — HOSPITAL ENCOUNTER (EMERGENCY)
Facility: HOSPITAL | Age: 45
Discharge: HOME | End: 2025-03-01
Attending: EMERGENCY MEDICINE | Admitting: EMERGENCY MEDICINE
Payer: COMMERCIAL

## 2025-03-01 VITALS
HEART RATE: 122 BPM | HEIGHT: 68 IN | TEMPERATURE: 97.8 F | DIASTOLIC BLOOD PRESSURE: 93 MMHG | WEIGHT: 183.64 LBS | RESPIRATION RATE: 16 BRPM | OXYGEN SATURATION: 99 % | SYSTOLIC BLOOD PRESSURE: 142 MMHG | BODY MASS INDEX: 27.83 KG/M2

## 2025-03-01 DIAGNOSIS — K52.9 GASTROENTERITIS: Primary | ICD-10-CM

## 2025-03-01 LAB
ALBUMIN SERPL BCG-MCNC: 4.8 G/DL (ref 3.5–5)
ALP SERPL-CCNC: 121 U/L (ref 34–104)
ALT SERPL W P-5'-P-CCNC: 14 U/L (ref 7–52)
ANION GAP SERPL CALCULATED.3IONS-SCNC: 12 MMOL/L (ref 4–13)
ANISOCYTOSIS BLD QL SMEAR: PRESENT
AST SERPL W P-5'-P-CCNC: 14 U/L (ref 13–39)
BASOPHILS # BLD MANUAL: 0 THOUSAND/UL (ref 0–0.1)
BASOPHILS NFR MAR MANUAL: 0 % (ref 0–1)
BILIRUB SERPL-MCNC: 1.21 MG/DL (ref 0.2–1)
BUN SERPL-MCNC: 17 MG/DL (ref 5–25)
CALCIUM SERPL-MCNC: 9.9 MG/DL (ref 8.4–10.2)
CHLORIDE SERPL-SCNC: 101 MMOL/L (ref 96–108)
CO2 SERPL-SCNC: 21 MMOL/L (ref 21–32)
CREAT SERPL-MCNC: 1.25 MG/DL (ref 0.6–1.3)
EOSINOPHIL # BLD MANUAL: 0 THOUSAND/UL (ref 0–0.4)
EOSINOPHIL NFR BLD MANUAL: 0 % (ref 0–6)
ERYTHROCYTE [DISTWIDTH] IN BLOOD BY AUTOMATED COUNT: 13.4 % (ref 11.6–15.1)
FLUAV AG UPPER RESP QL IA.RAPID: NEGATIVE
FLUBV AG UPPER RESP QL IA.RAPID: NEGATIVE
GFR SERPL CREATININE-BSD FRML MDRD: 69 ML/MIN/1.73SQ M
GLUCOSE SERPL-MCNC: 103 MG/DL (ref 65–140)
HCT VFR BLD AUTO: 53.9 % (ref 36.5–49.3)
HGB BLD-MCNC: 17.9 G/DL (ref 12–17)
LACTATE SERPL-SCNC: 1 MMOL/L (ref 0.5–2)
LG PLATELETS BLD QL SMEAR: PRESENT
LIPASE SERPL-CCNC: 20 U/L (ref 11–82)
LYMPHOCYTES # BLD AUTO: 0.66 THOUSAND/UL (ref 0.6–4.47)
LYMPHOCYTES # BLD AUTO: 3 % (ref 14–44)
MAGNESIUM SERPL-MCNC: 2 MG/DL (ref 1.9–2.7)
MCH RBC QN AUTO: 27.2 PG (ref 26.8–34.3)
MCHC RBC AUTO-ENTMCNC: 33.2 G/DL (ref 31.4–37.4)
MCV RBC AUTO: 82 FL (ref 82–98)
MICROCYTES BLD QL AUTO: PRESENT
MONOCYTES # BLD AUTO: 0.16 THOUSAND/UL (ref 0–1.22)
MONOCYTES NFR BLD: 1 % (ref 4–12)
NEUTROPHILS # BLD MANUAL: 15.58 THOUSAND/UL (ref 1.85–7.62)
NEUTS SEG NFR BLD AUTO: 95 % (ref 43–75)
PLATELET # BLD AUTO: 320 THOUSANDS/UL (ref 149–390)
PLATELET BLD QL SMEAR: ADEQUATE
PMV BLD AUTO: 9.4 FL (ref 8.9–12.7)
POIKILOCYTOSIS BLD QL SMEAR: PRESENT
POLYCHROMASIA BLD QL SMEAR: PRESENT
POTASSIUM SERPL-SCNC: 4.5 MMOL/L (ref 3.5–5.3)
PROT SERPL-MCNC: 8.6 G/DL (ref 6.4–8.4)
RBC # BLD AUTO: 6.58 MILLION/UL (ref 3.88–5.62)
RBC MORPH BLD: PRESENT
SARS-COV+SARS-COV-2 AG RESP QL IA.RAPID: NEGATIVE
SODIUM SERPL-SCNC: 134 MMOL/L (ref 135–147)
VARIANT LYMPHS # BLD AUTO: 1 %
WBC # BLD AUTO: 16.4 THOUSAND/UL (ref 4.31–10.16)

## 2025-03-01 PROCEDURE — 83690 ASSAY OF LIPASE: CPT | Performed by: EMERGENCY MEDICINE

## 2025-03-01 PROCEDURE — 99284 EMERGENCY DEPT VISIT MOD MDM: CPT

## 2025-03-01 PROCEDURE — 85027 COMPLETE CBC AUTOMATED: CPT | Performed by: EMERGENCY MEDICINE

## 2025-03-01 PROCEDURE — 83735 ASSAY OF MAGNESIUM: CPT | Performed by: EMERGENCY MEDICINE

## 2025-03-01 PROCEDURE — 83605 ASSAY OF LACTIC ACID: CPT | Performed by: EMERGENCY MEDICINE

## 2025-03-01 PROCEDURE — 36415 COLL VENOUS BLD VENIPUNCTURE: CPT | Performed by: EMERGENCY MEDICINE

## 2025-03-01 PROCEDURE — 96361 HYDRATE IV INFUSION ADD-ON: CPT

## 2025-03-01 PROCEDURE — 87811 SARS-COV-2 COVID19 W/OPTIC: CPT | Performed by: EMERGENCY MEDICINE

## 2025-03-01 PROCEDURE — 85007 BL SMEAR W/DIFF WBC COUNT: CPT | Performed by: EMERGENCY MEDICINE

## 2025-03-01 PROCEDURE — 99284 EMERGENCY DEPT VISIT MOD MDM: CPT | Performed by: EMERGENCY MEDICINE

## 2025-03-01 PROCEDURE — 74176 CT ABD & PELVIS W/O CONTRAST: CPT

## 2025-03-01 PROCEDURE — 80053 COMPREHEN METABOLIC PANEL: CPT | Performed by: EMERGENCY MEDICINE

## 2025-03-01 PROCEDURE — 96374 THER/PROPH/DIAG INJ IV PUSH: CPT

## 2025-03-01 PROCEDURE — 87804 INFLUENZA ASSAY W/OPTIC: CPT | Performed by: EMERGENCY MEDICINE

## 2025-03-01 RX ORDER — ONDANSETRON 4 MG/1
4 TABLET, FILM COATED ORAL EVERY 6 HOURS
Qty: 12 TABLET | Refills: 0 | Status: SHIPPED | OUTPATIENT
Start: 2025-03-01

## 2025-03-01 RX ORDER — ONDANSETRON 2 MG/ML
4 INJECTION INTRAMUSCULAR; INTRAVENOUS ONCE
Status: COMPLETED | OUTPATIENT
Start: 2025-03-01 | End: 2025-03-01

## 2025-03-01 RX ADMIN — ONDANSETRON 4 MG: 2 INJECTION INTRAMUSCULAR; INTRAVENOUS at 15:32

## 2025-03-01 RX ADMIN — SODIUM CHLORIDE 2000 ML: 0.9 INJECTION, SOLUTION INTRAVENOUS at 15:32

## 2025-03-01 NOTE — DISCHARGE INSTRUCTIONS
"Patient Education     Viral gastroenteritis in adults   The Basics   Written by the doctors and editors at [facility]   What is viral gastroenteritis? -- Viral gastroenteritis is an infection that can cause diarrhea and vomiting. It happens when a person's stomach and intestines get infected with a virus (figure 1). One of the most common causes of gastroenteritis is norovirus. But other viruses can cause it, too.  People can get viral gastroenteritis if they:   Touch an infected person or a surface with the virus on it, and then don't wash their hands   Eat foods or drink liquids with the virus in them. If people with the virus don't wash their hands, they can spread it to food or liquids they touch.  What are the symptoms of viral gastroenteritis? -- The infection causes diarrhea and vomiting. People can have either diarrhea or vomiting, or both. These symptoms usually start suddenly, and can be severe.  Viral gastroenteritis can also cause:   Fever   Headache or muscle aches   Belly pain or cramping   Loss of appetite  If you have a lot of diarrhea and vomiting, your body can lose too much water. This is known as \"dehydration.\" Dehydration can make you feel thirsty, tired, dizzy, or even confused. It can also make your urine look dark yellow.  Severe dehydration can be life-threatening. Older people are more likely to get severe dehydration.  Will I need tests? -- Not usually. Your doctor or nurse should be able to tell if you have viral gastroenteritis by learning about your symptoms and doing an exam. But the doctor or nurse might do tests to check for dehydration or to see which virus is causing the infection. These tests can include:   Blood tests   Urine tests   Tests on a sample of bowel movement  Is there anything I can do on my own to feel better? -- Yes. You need to replace the body's fluids that are lost through vomiting and diarrhea:   Drink fluids when you are able. It might help to take small sips " "every 15 to 30 minutes. Try to drink more as you start to feel better.   When you have a lot of vomiting or diarrhea, your body loses both water and salt. Drinking fluids that contain some salt can help replace what your body has lost. Examples include \"oral rehydration solutions,\" sports drinks, and broth. If you drink a lot of plain water, make sure you are also eating. This will help your body keep the right salt and water balance.   Avoid drinks with a lot of sugar, like juice or soda. Avoid alcohol, too.   Eat when you are able. If you can keep food down, it's best to eat lean meats, fruits, vegetables, and whole-grain breads and cereals. Avoid eating foods with a lot of fat or sugar, which can make symptoms worse.  If you are an adult younger than 65 and you have a new bout of diarrhea, and no fever and no blood in your bowel movements, you can take medicine to stop diarrhea such as loperamide (brand name: Imodium) for 1 to 2 days. But if you are older than 65, have a fever, or have blood in your bowel movements, do not take these medicines without checking with your doctor.  If you have diabetes, you might need to check your blood sugar more often until you feel better. Ask your doctor or nurse about this.  Should I call the doctor or nurse? -- Call the doctor or nurse if you:   Have any symptoms of dehydration, like feeling very tired, thirsty, dizzy, or confused   Have diarrhea or vomiting that lasts longer than a few days   Vomit up blood, have bloody diarrhea, or have severe belly pain   Haven't been able to drink anything for many hours   Haven't needed to urinate in the past 6 to 8 hours (during the day)  How is viral gastroenteritis treated? -- Most people do not need any treatment, because their symptoms will get better on their own. But people with severe dehydration might need treatment in the hospital. This involves getting fluids through a thin tube that goes into a vein, called an \"IV.\"  Doctors " "do not treat viral gastroenteritis with antibiotics. That's because antibiotics treat infections that are caused by bacteria, not viruses.  Can viral gastroenteritis be prevented? -- Sometimes. To lower the chance of getting or spreading the infection, wash your hands well with soap and water:   After you use the bathroom   Before you eat   Before you prepare food  Also, if you are caring for a child in diapers, wash your hands well after changing diapers. Do not change diapers near where you cook or eat food.  All topics are updated as new evidence becomes available and our peer review process is complete.  This topic retrieved from [employer] on: Mar 06, 2024.  Topic 59024 Version 17.0  Release: 32.2.4 - C32.64  © 2024 UpToDate, Inc. and/or its affiliates. All rights reserved.  figure 1: Digestive system     This drawing shows the organs in the body that process food. Together, these organs are called the \"digestive system\" or \"digestive tract.\" As food travels through this system, the body absorbs nutrients and water.  Graphic 14422 Version 5.0  Consumer Information Use and Disclaimer   Disclaimer: This generalized information is a limited summary of diagnosis, treatment, and/or medication information. It is not meant to be comprehensive and should be used as a tool to help the user understand and/or assess potential diagnostic and treatment options. It does NOT include all information about conditions, treatments, medications, side effects, or risks that may apply to a specific patient. It is not intended to be medical advice or a substitute for the medical advice, diagnosis, or treatment of a health care provider based on the health care provider's examination and assessment of a patient's specific and unique circumstances. Patients must speak with a health care provider for complete information about their health, medical questions, and treatment options, including any risks or benefits regarding use of " medications. This information does not endorse any treatments or medications as safe, effective, or approved for treating a specific patient. UpToDate, Inc. and its affiliates disclaim any warranty or liability relating to this information or the use thereof.The use of this information is governed by the Terms of Use, available at https://www.Context[employer].com/en/know/clinical-effectiveness-terms. 2024© UpToDate, Inc. and its affiliates and/or licensors. All rights reserved.  Copyright   © 2024 UpToDate, Inc. and/or its affiliates. All rights reserved.

## 2025-03-01 NOTE — ED PROVIDER NOTES
Time reflects when diagnosis was documented in both MDM as applicable and the Disposition within this note       Time User Action Codes Description Comment    3/1/2025  3:37 PM Gino Montana Add [K52.9] Gastroenteritis           ED Disposition       ED Disposition   Discharge    Condition   Stable    Date/Time   Sat Mar 1, 2025  4:31 PM    Comment   [place] discharge to home/self care.                   Assessment & Plan       Medical Decision Making  Amount and/or Complexity of Data Reviewed  Labs: ordered. Decision-making details documented in ED Course.  Radiology: ordered. Decision-making details documented in ED Course.  Discussion of management or test interpretation with external provider(s): At risk for but not limited to diarrhea, gastroenteritis, COVID, flu, electrolyte abnormality, hypokalemia, hypomagnesemia, LENCHO, dehydration    Risk  Prescription drug management.        ED Course as of 03/01/25 1632   Sat Mar 01, 2025   1545 WBC(!): 16.40  Could be stress response secondary to nausea vomiting or diarrhea.  Also hemoconcentrated.       Medications   sodium chloride 0.9 % bolus 2,000 mL (2,000 mL Intravenous New Bag 3/1/25 1532)   ondansetron (ZOFRAN) injection 4 mg (4 mg Intravenous Given 3/1/25 1532)       ED Risk Strat Scores                            SBIRT 20yo+      Flowsheet Row Most Recent Value   Initial Alcohol Screen: US AUDIT-C     1. How often do you have a drink containing alcohol? 0 Filed at: 03/01/2025 1510   2. How many drinks containing alcohol do you have on a typical day you are drinking?  0 Filed at: 03/01/2025 1510   3a. Male UNDER 65: How often do you have five or more drinks on one occasion? 0 Filed at: 03/01/2025 1510   Audit-C Score 0 Filed at: 03/01/2025 1510   BONIFACIO: How many times in the past year have you...    Used an illegal drug or used a prescription medication for non-medical reasons? Never Filed at: 03/01/2025 1510                            History of Present  Illness       Chief Complaint   Patient presents with    Flu Symptoms     Patient reports he's dehydrated from vomiting and diarrhea since 0800. Daughter recently sick with similar.        Past Medical History:   Diagnosis Date    Colitis       Past Surgical History:   Procedure Laterality Date    COLON SURGERY      pt states no large intestine or colon      History reviewed. No pertinent family history.   Social History     Tobacco Use    Smoking status: Never    Smokeless tobacco: Never   Vaping Use    Vaping status: Never Used   Substance Use Topics    Alcohol use: Never    Drug use: Never      E-Cigarette/Vaping    E-Cigarette Use Never User       E-Cigarette/Vaping Substances      I have reviewed and agree with the history as documented.     Patient had colon resection secondary colitis.  Daughter recently sick with nausea vomiting and diarrhea.  Now the patient starts with the same symptoms starting at 9 AM today.  Did get some relief with Zofran.  Has baseline diarrhea.  Worse than normal.  No bloody stools or black stools.  No tarry stools.  No hematemesis.  Feels weak and lightheaded and dehydrated.      History provided by:  Patient  Flu Symptoms  Presenting symptoms: diarrhea, myalgias, nausea and vomiting    Presenting symptoms: no cough, no fever, no headaches, no shortness of breath and no sore throat    Severity:  Mild  Onset quality:  Sudden  Duration:  6 hours  Progression:  Unchanged  Chronicity:  New  Relieved by: Zofran.  Worsened by:  Nothing  Ineffective treatments:  None tried  Associated symptoms: no chills, no decreased appetite, no ear pain, no congestion and no neck stiffness        Review of Systems   Constitutional:  Negative for chills, decreased appetite and fever.   HENT:  Negative for congestion, ear pain, hearing loss, sore throat, trouble swallowing and voice change.    Eyes:  Negative for pain and discharge.   Respiratory:  Negative for cough, shortness of breath and wheezing.     Cardiovascular:  Negative for chest pain and palpitations.   Gastrointestinal:  Positive for diarrhea, nausea and vomiting. Negative for abdominal pain, blood in stool and constipation.   Genitourinary:  Negative for dysuria, flank pain, frequency and hematuria.   Musculoskeletal:  Positive for myalgias. Negative for joint swelling, neck pain and neck stiffness.   Skin:  Negative for rash and wound.   Neurological:  Negative for dizziness, seizures, syncope, facial asymmetry and headaches.   Psychiatric/Behavioral:  Negative for hallucinations, self-injury and suicidal ideas.    All other systems reviewed and are negative.          Objective       ED Triage Vitals [03/01/25 1507]   Temperature Pulse Blood Pressure Respirations SpO2 Patient Position - Orthostatic VS   97.8 °F (36.6 °C) (!) 122 142/93 16 99 % Lying      Temp Source Heart Rate Source BP Location FiO2 (%) Pain Score    Temporal Monitor Right arm -- 7      Vitals      Date and Time Temp Pulse SpO2 Resp BP Pain Score FACES Pain Rating User   03/01/25 1507 97.8 °F (36.6 °C) 122 99 % 16 142/93 7 -- SBE            Physical Exam  Vitals and nursing note reviewed.   Constitutional:       General: He is not in acute distress.     Appearance: He is well-developed.   HENT:      Head: Normocephalic and atraumatic.      Right Ear: External ear normal.      Left Ear: External ear normal.      Mouth/Throat:      Mouth: Mucous membranes are dry.   Eyes:      General: No scleral icterus.        Right eye: No discharge.         Left eye: No discharge.      Extraocular Movements: Extraocular movements intact.      Conjunctiva/sclera: Conjunctivae normal.   Cardiovascular:      Rate and Rhythm: Normal rate and regular rhythm.      Heart sounds: Normal heart sounds. No murmur heard.  Pulmonary:      Effort: Pulmonary effort is normal.      Breath sounds: Normal breath sounds. No wheezing or rales.   Abdominal:      General: Bowel sounds are normal. There is no  distension.      Palpations: Abdomen is soft.      Tenderness: There is no abdominal tenderness. There is no guarding or rebound.   Musculoskeletal:         General: No deformity. Normal range of motion.      Cervical back: Normal range of motion and neck supple.   Skin:     General: Skin is warm and dry.      Findings: No rash.   Neurological:      General: No focal deficit present.      Mental Status: He is alert and oriented to person, place, and time.      Cranial Nerves: No cranial nerve deficit.   Psychiatric:         Mood and Affect: Mood normal.         Behavior: Behavior normal.         Thought Content: Thought content normal.         Judgment: Judgment normal.         Results Reviewed       Procedure Component Value Units Date/Time    Manual Differential(PHLEBS Do Not Order) [[id number]]  (Abnormal) Collected: 03/01/25 1532    Lab Status: Final result Specimen: Blood from Arm, Right Updated: 03/01/25 1626     Segmented % 95 %      Lymphocytes % 3 %      Monocytes % 1 %      Eosinophils % 0 %      Basophils % 0 %      Atypical Lymphocytes % 1 %      Absolute Neutrophils 15.58 Thousand/uL      Absolute Lymphocytes 0.66 Thousand/uL      Absolute Monocytes 0.16 Thousand/uL      Absolute Eosinophils 0.00 Thousand/uL      Absolute Basophils 0.00 Thousand/uL      Total Counted --     RBC Morphology Present     Platelet Estimate Adequate     Large Platelet Present     Anisocytosis Present     Microcytes Present     Poikilocytes Present     Polychromasia Present    Comprehensive metabolic panel [[id number]]  (Abnormal) Collected: 03/01/25 1532    Lab Status: Final result Specimen: Blood from Arm, Right Updated: 03/01/25 1600     Sodium 134 mmol/L      Potassium 4.5 mmol/L      Chloride 101 mmol/L      CO2 21 mmol/L      ANION GAP 12 mmol/L      BUN 17 mg/dL      Creatinine 1.25 mg/dL      Glucose 103 mg/dL      Calcium 9.9 mg/dL      AST 14 U/L      ALT 14 U/L      Alkaline Phosphatase 121 U/L      Total Protein 8.6  g/dL      Albumin 4.8 g/dL      Total Bilirubin 1.21 mg/dL      eGFR 69 ml/min/1.73sq m     Narrative:      [employer] guidelines for Chronic Kidney Disease (CKD):     Stage 1 with normal or high GFR (GFR > 90 mL/min/1.73 square meters)    Stage 2 Mild CKD (GFR = 60-89 mL/min/1.73 square meters)    Stage 3A Moderate CKD (GFR = 45-59 mL/min/1.73 square meters)    Stage 3B Moderate CKD (GFR = 30-44 mL/min/1.73 square meters)    Stage 4 Severe CKD (GFR = 15-29 mL/min/1.73 square meters)    Stage 5 End Stage CKD (GFR <15 mL/min/1.73 square meters)  Note: GFR calculation is accurate only with a steady state creatinine    Magnesium [[id number]]  (Normal) Collected: 03/01/25 1532    Lab Status: Final result Specimen: Blood from Arm, Right Updated: 03/01/25 1600     Magnesium 2.0 mg/dL     Lipase [[id number]]  (Normal) Collected: 03/01/25 1532    Lab Status: Final result Specimen: Blood from Arm, Right Updated: 03/01/25 1600     Lipase 20 u/L     FLU/COVID Rapid Antigen (30 min. TAT) - Preferred screening test in ED [[id number]]  (Normal) Collected: 03/01/25 1532    Lab Status: Final result Specimen: Nares from Nose Updated: 03/01/25 1559     SARS COV Rapid Antigen Negative     Influenza A Rapid Antigen Negative     Influenza B Rapid Antigen Negative    Narrative:      This test has been performed using the Quidel Bailey 2 FLU+SARS Antigen test under the Emergency Use Authorization (EUA). This test has been validated by the  and verified by the performing laboratory. The Bailey uses lateral flow immunofluorescent sandwich assay to detect SARS-COV, Influenza A and Influenza B Antigen.     The Quidel Bailey 2 SARS Antigen test does not differentiate between SARS-CoV and SARS-CoV-2.     Negative results are presumptive and may be confirmed with a molecular assay, if necessary, for patient management. Negative results do not rule out SARS-CoV-2 or influenza infection and should not be used as the  sole basis for treatment or patient management decisions. A negative test result may occur if the level of antigen in a sample is below the limit of detection of this test.     Positive results are indicative of the presence of viral antigens, but do not rule out bacterial infection or co-infection with other viruses.     All test results should be used as an adjunct to clinical observations and other information available to the provider.    FOR PEDIATRIC PATIENTS - copy/paste COVID Guidelines URL to browser: [email or web address]    Lactic acid, plasma (w/reflex if result > 2.0) [[id number]]  (Normal) Collected: 03/01/25 1532    Lab Status: Final result Specimen: Blood from Arm, Right Updated: 03/01/25 1559     LACTIC ACID 1.0 mmol/L     Narrative:      Result may be elevated if tourniquet was used during collection.    CBC and differential [[id number]]  (Abnormal) Collected: 03/01/25 1532    Lab Status: Final result Specimen: Blood from Arm, Right Updated: 03/01/25 1546     WBC 16.40 Thousand/uL      RBC 6.58 Million/uL      Hemoglobin 17.9 g/dL      Hematocrit 53.9 %      MCV 82 fL      MCH 27.2 pg      MCHC 33.2 g/dL      RDW 13.4 %      MPV 9.4 fL      Platelets 320 Thousands/uL     Narrative:      This is an appended report.  These results have been appended to a previously verified report.            CT abdomen pelvis wo contrast   Final Interpretation by Lety Carvalho MD (03/01 1624)      Fluid-filled loops of small bowel, likely diarrheal in etiology.      Workstation performed: [id number]             Procedures    ED Medication and Procedure Management   Prior to Admission Medications   Prescriptions Last Dose Informant Patient Reported? Taking?   naproxen (NAPROSYN) 500 mg tablet   No No   Sig: Take 1 tablet (500 mg total) by mouth 2 (two) times a day as needed for mild pain or moderate pain   ondansetron (ZOFRAN-ODT) 4 mg disintegrating tablet  3/1/2025  No Yes   Sig: Take 1 tablet (4 mg total) by mouth every 6 (six) hours as needed for nausea or vomiting      Facility-Administered Medications: None     Patient's Medications   Discharge Prescriptions    ONDANSETRON (ZOFRAN) 4 MG TABLET    Take 1 tablet (4 mg total) by mouth every 6 (six) hours       Start Date: 3/1/2025  End Date: --       Order Dose: 4 mg       Quantity: 12 tablet    Refills: 0     No discharge procedures on file.  ED SEPSIS DOCUMENTATION   Time reflects when diagnosis was documented in both MDM as applicable and the Disposition within this note       Time User Action Codes Description Comment    3/1/2025  3:37 PM Gino Montana Add [K52.9] Gastroenteritis                  Gino Montana MD  03/01/25 5281

## 2025-04-15 ENCOUNTER — DOCTOR'S OFFICE (OUTPATIENT)
Dept: URBAN - NONMETROPOLITAN AREA CLINIC 1 | Facility: CLINIC | Age: 45
Setting detail: OPHTHALMOLOGY
End: 2025-04-15
Payer: COMMERCIAL

## 2025-04-15 DIAGNOSIS — H00.12: ICD-10-CM

## 2025-04-15 DIAGNOSIS — H20.00: ICD-10-CM

## 2025-04-15 DIAGNOSIS — H25.13: ICD-10-CM

## 2025-04-15 PROCEDURE — 99213 OFFICE O/P EST LOW 20 MIN: CPT | Performed by: OPHTHALMOLOGY

## 2025-04-15 ASSESSMENT — REFRACTION_AUTOREFRACTION
OS_AXIS: 092
OS_CYLINDER: -0.25
OD_SPHERE: +1.00
OS_SPHERE: +0.75
OD_AXIS: 077
OD_CYLINDER: -0.75

## 2025-04-15 ASSESSMENT — REFRACTION_MANIFEST
OS_CYLINDER: -0.50
OD_VA2: 20/20
OS_VA1: 20/20
OD_VA1: 20/20
OS_VA1: 20/20
OS_CYLINDER: -0.50
OD_AXIS: 100
OS_AXIS: 090
OS_VA2: 20/20
OD_SPHERE: +1.00
OD_CYLINDER: -0.50
OD_SPHERE: +1.00
OD_AXIS: 100
OS_SPHERE: +1.00
OD_ADD: +1.00
OU_VA: 20/20
OS_SPHERE: +1.00
OD_CYLINDER: -0.50
OD_VA1: 20/20
OS_AXIS: 090
OS_ADD: +1.00

## 2025-04-15 ASSESSMENT — KERATOMETRY
OS_K1POWER_DIOPTERS: 45.50
OD_AXISANGLE_DEGREES: 032
OD_K1POWER_DIOPTERS: 45.25
OS_AXISANGLE_DEGREES: 035
OD_K2POWER_DIOPTERS: 45.75
OS_K2POWER_DIOPTERS: 46.00

## 2025-04-15 ASSESSMENT — VISUAL ACUITY
OD_BCVA: 20/20
OS_BCVA: 20/20

## 2025-04-15 ASSESSMENT — REFRACTION_CURRENTRX
OD_SPHERE: +0.75
OS_VPRISM_DIRECTION: SV
OD_AXIS: 090
OS_SPHERE: +0.75
OS_CYLINDER: -0.50
OD_VPRISM_DIRECTION: SV
OD_CYLINDER: -0.50
OS_OVR_VA: 20/
OS_AXIS: 072
OD_OVR_VA: 20/

## 2025-04-15 ASSESSMENT — CONFRONTATIONAL VISUAL FIELD TEST (CVF)
OS_FINDINGS: FULL
OD_FINDINGS: FULL